# Patient Record
Sex: MALE
[De-identification: names, ages, dates, MRNs, and addresses within clinical notes are randomized per-mention and may not be internally consistent; named-entity substitution may affect disease eponyms.]

---

## 2017-08-26 ENCOUNTER — RESULT REVIEW (OUTPATIENT)
Age: 62
End: 2017-08-26

## 2024-07-22 ENCOUNTER — APPOINTMENT (OUTPATIENT)
Dept: MRI IMAGING | Facility: CLINIC | Age: 69
End: 2024-07-22
Payer: MEDICARE

## 2024-07-22 ENCOUNTER — APPOINTMENT (OUTPATIENT)
Dept: OTOLARYNGOLOGY | Facility: CLINIC | Age: 69
End: 2024-07-22
Payer: MEDICARE

## 2024-07-22 ENCOUNTER — RESULT REVIEW (OUTPATIENT)
Age: 69
End: 2024-07-22

## 2024-07-22 VITALS — HEIGHT: 66 IN | BODY MASS INDEX: 29.57 KG/M2 | WEIGHT: 184 LBS

## 2024-07-22 DIAGNOSIS — M23.209 DERANGEMENT OF UNSPECIFIED MENISCUS DUE TO OLD TEAR OR INJURY, UNSPECIFIED KNEE: ICD-10-CM

## 2024-07-22 DIAGNOSIS — E78.5 HYPERLIPIDEMIA, UNSPECIFIED: ICD-10-CM

## 2024-07-22 DIAGNOSIS — Z80.52 FAMILY HISTORY OF MALIGNANT NEOPLASM OF BLADDER: ICD-10-CM

## 2024-07-22 DIAGNOSIS — I10 ESSENTIAL (PRIMARY) HYPERTENSION: ICD-10-CM

## 2024-07-22 DIAGNOSIS — Z87.891 PERSONAL HISTORY OF NICOTINE DEPENDENCE: ICD-10-CM

## 2024-07-22 DIAGNOSIS — Z80.42 FAMILY HISTORY OF MALIGNANT NEOPLASM OF PROSTATE: ICD-10-CM

## 2024-07-22 DIAGNOSIS — H90.3 SENSORINEURAL HEARING LOSS, BILATERAL: ICD-10-CM

## 2024-07-22 DIAGNOSIS — H93.13 TINNITUS, BILATERAL: ICD-10-CM

## 2024-07-22 PROCEDURE — 99204 OFFICE O/P NEW MOD 45 MIN: CPT

## 2024-07-22 PROCEDURE — 92557 COMPREHENSIVE HEARING TEST: CPT

## 2024-07-22 PROCEDURE — 92567 TYMPANOMETRY: CPT

## 2024-07-22 PROCEDURE — 70551 MRI BRAIN STEM W/O DYE: CPT | Mod: 26

## 2024-07-22 RX ORDER — METOPROLOL SUCCINATE 200 MG/1
TABLET, EXTENDED RELEASE ORAL
Refills: 0 | Status: ACTIVE | COMMUNITY

## 2024-07-22 RX ORDER — ROSUVASTATIN CALCIUM 5 MG/1
TABLET, FILM COATED ORAL
Refills: 0 | Status: ACTIVE | COMMUNITY

## 2024-07-22 RX ORDER — OMEPRAZOLE 40 MG/1
CAPSULE, DELAYED RELEASE ORAL
Refills: 0 | Status: ACTIVE | COMMUNITY

## 2024-07-22 RX ORDER — FLUTICASONE PROPIONATE 50 MCG
50 SPRAY, SUSPENSION NASAL
Refills: 0 | Status: ACTIVE | COMMUNITY

## 2024-07-22 RX ORDER — FEXOFENADINE HCL 60 MG
CAPSULE ORAL
Refills: 0 | Status: ACTIVE | COMMUNITY

## 2024-07-22 RX ORDER — OLMESARTAN MEDOXOMIL 40 MG/1
TABLET, FILM COATED ORAL
Refills: 0 | Status: ACTIVE | COMMUNITY

## 2024-07-23 PROBLEM — H93.13 BILATERAL TINNITUS: Status: ACTIVE | Noted: 2024-07-23

## 2024-07-23 NOTE — DATA REVIEWED
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

## 2024-07-23 NOTE — HISTORY OF PRESENT ILLNESS
[de-identified] : 68 yo M with referred by Dr. Sher for evaluation of tinnitus AU. No hx of hearing loss. Saw Dr. Sher once for allergy workup. Tinnitus for past 10 years - initially intermittent and now constant. Tinnitus gets louder with clenching jaw or when turning head left and right. Never had hearing evaluation. No otalgia, otorrhea, ear infections, or headaches related to hearing. Hx of sinus infections. Hx of vertigo episode lasting for 40 minutes - spontaneously resolved and occurred 4 years ago. No hx of head trauma or exposure to loud noises. Father with hearing loss in his 80s. No recent imaging. No hx of migraines

## 2024-07-23 NOTE — HISTORY OF PRESENT ILLNESS
[de-identified] : 70 yo M with referred by Dr. Sher for evaluation of tinnitus AU. No hx of hearing loss. Saw Dr. Sher once for allergy workup. Tinnitus for past 10 years - initially intermittent and now constant. Tinnitus gets louder with clenching jaw or when turning head left and right. Never had hearing evaluation. No otalgia, otorrhea, ear infections, or headaches related to hearing. Hx of sinus infections. Hx of vertigo episode lasting for 40 minutes - spontaneously resolved and occurred 4 years ago. No hx of head trauma or exposure to loud noises. Father with hearing loss in his 80s. No recent imaging. No hx of migraines

## 2024-07-23 NOTE — DATA REVIEWED
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

## 2025-05-01 ENCOUNTER — NON-APPOINTMENT (OUTPATIENT)
Age: 70
End: 2025-05-01